# Patient Record
Sex: FEMALE | Race: WHITE | NOT HISPANIC OR LATINO | Employment: PART TIME | URBAN - METROPOLITAN AREA
[De-identification: names, ages, dates, MRNs, and addresses within clinical notes are randomized per-mention and may not be internally consistent; named-entity substitution may affect disease eponyms.]

---

## 2018-07-05 ENCOUNTER — TRANSCRIBE ORDERS (OUTPATIENT)
Dept: ADMINISTRATIVE | Facility: HOSPITAL | Age: 33
End: 2018-07-05

## 2018-07-05 DIAGNOSIS — Z80.3 FAMILY HISTORY OF BREAST CANCER IN MOTHER: ICD-10-CM

## 2018-07-05 DIAGNOSIS — Z12.31 SCREENING MAMMOGRAM, ENCOUNTER FOR: Primary | ICD-10-CM

## 2018-07-05 DIAGNOSIS — R22.31 AXILLARY LUMP, RIGHT: ICD-10-CM

## 2018-07-13 ENCOUNTER — HOSPITAL ENCOUNTER (OUTPATIENT)
Dept: ULTRASOUND IMAGING | Facility: HOSPITAL | Age: 33
Discharge: HOME | End: 2018-07-13

## 2018-07-13 ENCOUNTER — HOSPITAL ENCOUNTER (OUTPATIENT)
Dept: ULTRASOUND IMAGING | Facility: HOSPITAL | Age: 33
End: 2018-07-13

## 2018-07-13 ENCOUNTER — HOSPITAL ENCOUNTER (OUTPATIENT)
Dept: MAMMOGRAPHY | Facility: HOSPITAL | Age: 33
Discharge: HOME | End: 2018-07-13
Admitting: OBSTETRICS & GYNECOLOGY

## 2018-07-13 DIAGNOSIS — Z80.3 FAMILY HISTORY OF BREAST CANCER IN MOTHER: ICD-10-CM

## 2018-07-13 DIAGNOSIS — Z12.31 SCREENING MAMMOGRAM, ENCOUNTER FOR: ICD-10-CM

## 2018-07-13 DIAGNOSIS — R22.31 AXILLARY LUMP, RIGHT: ICD-10-CM

## 2018-07-13 PROCEDURE — 77066 DX MAMMO INCL CAD BI: CPT

## 2018-07-13 PROCEDURE — 76641 ULTRASOUND BREAST COMPLETE: CPT

## 2018-07-13 PROCEDURE — G0279 TOMOSYNTHESIS, MAMMO: HCPCS

## 2024-02-19 ENCOUNTER — APPOINTMENT (OUTPATIENT)
Dept: CT IMAGING | Facility: HOSPITAL | Age: 39
End: 2024-02-19
Payer: COMMERCIAL

## 2024-02-19 ENCOUNTER — HOSPITAL ENCOUNTER (EMERGENCY)
Facility: HOSPITAL | Age: 39
Discharge: HOME | End: 2024-02-19
Admitting: EMERGENCY MEDICINE
Payer: COMMERCIAL

## 2024-02-19 VITALS
OXYGEN SATURATION: 100 % | BODY MASS INDEX: 23.8 KG/M2 | TEMPERATURE: 98.7 F | RESPIRATION RATE: 16 BRPM | SYSTOLIC BLOOD PRESSURE: 114 MMHG | HEART RATE: 59 BPM | DIASTOLIC BLOOD PRESSURE: 77 MMHG | HEIGHT: 71 IN | WEIGHT: 170 LBS

## 2024-02-19 DIAGNOSIS — R10.31 ACUTE ABDOMINAL PAIN IN RIGHT LOWER QUADRANT: Primary | ICD-10-CM

## 2024-02-19 DIAGNOSIS — N83.201 CYST OF RIGHT OVARY: ICD-10-CM

## 2024-02-19 DIAGNOSIS — R55 VASOVAGAL SYNCOPE: ICD-10-CM

## 2024-02-19 LAB
ALBUMIN SERPL-MCNC: 4.3 G/DL (ref 3.5–5.2)
ALBUMIN/GLOB SERPL: 1.7 G/DL
ALP SERPL-CCNC: 70 U/L (ref 39–117)
ALT SERPL W P-5'-P-CCNC: 23 U/L (ref 1–33)
ANION GAP SERPL CALCULATED.3IONS-SCNC: 9.7 MMOL/L (ref 5–15)
AST SERPL-CCNC: 27 U/L (ref 1–32)
BACTERIA UR QL AUTO: ABNORMAL /HPF
BASOPHILS # BLD AUTO: 0.03 10*3/MM3 (ref 0–0.2)
BASOPHILS NFR BLD AUTO: 0.9 % (ref 0–1.5)
BILIRUB SERPL-MCNC: 0.3 MG/DL (ref 0–1.2)
BILIRUB UR QL STRIP: NEGATIVE
BUN SERPL-MCNC: 13 MG/DL (ref 6–20)
BUN/CREAT SERPL: 13.7 (ref 7–25)
CALCIUM SPEC-SCNC: 8.8 MG/DL (ref 8.6–10.5)
CHLORIDE SERPL-SCNC: 106 MMOL/L (ref 98–107)
CLARITY UR: CLEAR
CO2 SERPL-SCNC: 25.3 MMOL/L (ref 22–29)
COLOR UR: YELLOW
CREAT SERPL-MCNC: 0.95 MG/DL (ref 0.57–1)
DEPRECATED RDW RBC AUTO: 37 FL (ref 37–54)
EGFRCR SERPLBLD CKD-EPI 2021: 78.8 ML/MIN/1.73
EOSINOPHIL # BLD AUTO: 0.05 10*3/MM3 (ref 0–0.4)
EOSINOPHIL NFR BLD AUTO: 1.4 % (ref 0.3–6.2)
ERYTHROCYTE [DISTWIDTH] IN BLOOD BY AUTOMATED COUNT: 12.2 % (ref 12.3–15.4)
GLOBULIN UR ELPH-MCNC: 2.6 GM/DL
GLUCOSE SERPL-MCNC: 102 MG/DL (ref 65–99)
GLUCOSE UR STRIP-MCNC: NEGATIVE MG/DL
HCG SERPL QL: NEGATIVE
HCT VFR BLD AUTO: 38.4 % (ref 34–46.6)
HGB BLD-MCNC: 12.8 G/DL (ref 12–15.9)
HGB UR QL STRIP.AUTO: NEGATIVE
HYALINE CASTS UR QL AUTO: ABNORMAL /LPF
IMM GRANULOCYTES # BLD AUTO: 0.01 10*3/MM3 (ref 0–0.05)
IMM GRANULOCYTES NFR BLD AUTO: 0.3 % (ref 0–0.5)
KETONES UR QL STRIP: NEGATIVE
LEUKOCYTE ESTERASE UR QL STRIP.AUTO: ABNORMAL
LIPASE SERPL-CCNC: 22 U/L (ref 13–60)
LYMPHOCYTES # BLD AUTO: 1.2 10*3/MM3 (ref 0.7–3.1)
LYMPHOCYTES NFR BLD AUTO: 34.8 % (ref 19.6–45.3)
MCH RBC QN AUTO: 28.3 PG (ref 26.6–33)
MCHC RBC AUTO-ENTMCNC: 33.3 G/DL (ref 31.5–35.7)
MCV RBC AUTO: 84.8 FL (ref 79–97)
MONOCYTES # BLD AUTO: 0.59 10*3/MM3 (ref 0.1–0.9)
MONOCYTES NFR BLD AUTO: 17.1 % (ref 5–12)
NEUTROPHILS NFR BLD AUTO: 1.57 10*3/MM3 (ref 1.7–7)
NEUTROPHILS NFR BLD AUTO: 45.5 % (ref 42.7–76)
NITRITE UR QL STRIP: NEGATIVE
NRBC BLD AUTO-RTO: 0 /100 WBC (ref 0–0.2)
PH UR STRIP.AUTO: 6.5 [PH] (ref 5–8)
PLATELET # BLD AUTO: 194 10*3/MM3 (ref 140–450)
PMV BLD AUTO: 8.9 FL (ref 6–12)
POTASSIUM SERPL-SCNC: 4.2 MMOL/L (ref 3.5–5.2)
PROT SERPL-MCNC: 6.9 G/DL (ref 6–8.5)
PROT UR QL STRIP: NEGATIVE
QT INTERVAL: 411 MS
QTC INTERVAL: 444 MS
RBC # BLD AUTO: 4.53 10*6/MM3 (ref 3.77–5.28)
RBC # UR STRIP: ABNORMAL /HPF
REF LAB TEST METHOD: ABNORMAL
SODIUM SERPL-SCNC: 141 MMOL/L (ref 136–145)
SP GR UR STRIP: 1.01 (ref 1–1.03)
SQUAMOUS #/AREA URNS HPF: ABNORMAL /HPF
TROPONIN T SERPL HS-MCNC: <6 NG/L
UROBILINOGEN UR QL STRIP: ABNORMAL
WBC # UR STRIP: ABNORMAL /HPF
WBC NRBC COR # BLD AUTO: 3.45 10*3/MM3 (ref 3.4–10.8)

## 2024-02-19 PROCEDURE — 99285 EMERGENCY DEPT VISIT HI MDM: CPT

## 2024-02-19 PROCEDURE — 25810000003 SODIUM CHLORIDE 0.9 % SOLUTION: Performed by: PHYSICIAN ASSISTANT

## 2024-02-19 PROCEDURE — 84703 CHORIONIC GONADOTROPIN ASSAY: CPT | Performed by: EMERGENCY MEDICINE

## 2024-02-19 PROCEDURE — 25510000001 IOPAMIDOL 61 % SOLUTION: Performed by: PHYSICIAN ASSISTANT

## 2024-02-19 PROCEDURE — 74177 CT ABD & PELVIS W/CONTRAST: CPT

## 2024-02-19 PROCEDURE — 85025 COMPLETE CBC W/AUTO DIFF WBC: CPT | Performed by: EMERGENCY MEDICINE

## 2024-02-19 PROCEDURE — 80053 COMPREHEN METABOLIC PANEL: CPT | Performed by: EMERGENCY MEDICINE

## 2024-02-19 PROCEDURE — 93010 ELECTROCARDIOGRAM REPORT: CPT | Performed by: INTERNAL MEDICINE

## 2024-02-19 PROCEDURE — 84484 ASSAY OF TROPONIN QUANT: CPT | Performed by: EMERGENCY MEDICINE

## 2024-02-19 PROCEDURE — 93005 ELECTROCARDIOGRAM TRACING: CPT | Performed by: EMERGENCY MEDICINE

## 2024-02-19 PROCEDURE — 81001 URINALYSIS AUTO W/SCOPE: CPT | Performed by: EMERGENCY MEDICINE

## 2024-02-19 PROCEDURE — 83690 ASSAY OF LIPASE: CPT | Performed by: EMERGENCY MEDICINE

## 2024-02-19 RX ORDER — SODIUM CHLORIDE 0.9 % (FLUSH) 0.9 %
10 SYRINGE (ML) INJECTION AS NEEDED
Status: DISCONTINUED | OUTPATIENT
Start: 2024-02-19 | End: 2024-02-19 | Stop reason: HOSPADM

## 2024-02-19 RX ADMIN — SODIUM CHLORIDE 1000 ML: 9 INJECTION, SOLUTION INTRAVENOUS at 09:14

## 2024-02-19 RX ADMIN — IOPAMIDOL 85 ML: 612 INJECTION, SOLUTION INTRAVENOUS at 09:27

## 2024-02-19 NOTE — ED PROVIDER NOTES
EMERGENCY DEPARTMENT ENCOUNTER  Room Number:  34/34  PCP: Provider, No Known  Independent Historians: Patient and Family      HPI:  Chief Complaint: had concerns including Abdominal Pain and Nausea.     A complete HPI/ROS/PMH/PSH/SH/FH are unobtainable due to: None    Chronic or social conditions impacting patient care (Social Determinants of Health): None      Context: Sharon Horne is a 38 y.o. female with a medical history of rheumatoid arthritis who presents to the ED c/o acute RLQ abdominal pain and syncope.  Patient reports she first noticed a brief RLQ pain last night that resolved.  She had another RLQ pain this morning and went to the bathroom.  After sitting on the toilet she got lightheaded, nauseated, and had a syncopal episode.  She called for her  who found her still sitting on the toilet.  She is not currently having abdominal pain.  No head injury.  Patient is not anticoagulated.  She denies fever, vomiting, chest pain, dyspnea, diarrhea, dysuria, or any other systemic complaint.      Review of prior external notes (non-ED) -and- Review of prior external test results outside of this encounter:  Patient seen at [facility] clinic on 2023 for upper respiratory infection.  Patient prescribed prednisone and Phenergan DM syrup.  Reviewed prior laboratory studies.  COVID/flu swab collected on 2022 positive for influenza A.    Prescription drug monitoring program review:     N/A    PAST MEDICAL HISTORY  Active Ambulatory Problems     Diagnosis Date Noted    No Active Ambulatory Problems     Resolved Ambulatory Problems     Diagnosis Date Noted    No Resolved Ambulatory Problems     Past Medical History:   Diagnosis Date    Drug therapy     Rheumatoid arteritis          PAST SURGICAL HISTORY  No past surgical history on file.      FAMILY HISTORY  Family History   Problem Relation Age of Onset    Breast cancer Mother 41                 SOCIAL HISTORY  Social History     Socioeconomic  History    Marital status:    Tobacco Use    Smoking status: Never     Passive exposure: Never    Smokeless tobacco: Never   Vaping Use    Vaping Use: Never used   Substance and Sexual Activity    Alcohol use: Yes    Drug use: Never    Sexual activity: Defer         ALLERGIES  Fexofenadine      REVIEW OF SYSTEMS  Review of Systems   Constitutional:  Negative for chills and fever.   HENT:  Negative for ear pain and sore throat.    Respiratory:  Negative for cough and shortness of breath.    Cardiovascular:  Negative for chest pain and palpitations.   Gastrointestinal:  Positive for abdominal pain and nausea. Negative for vomiting.   Genitourinary:  Negative for dysuria and hematuria.   Musculoskeletal:  Negative for arthralgias and joint swelling.   Skin:  Negative for pallor and rash.   Neurological:  Positive for syncope and light-headedness. Negative for numbness and headaches.   Psychiatric/Behavioral:  Negative for confusion and hallucinations.      Included in HPI  All systems reviewed and negative except for those discussed in HPI.      PHYSICAL EXAM    I have reviewed the triage vital signs and nursing notes.    ED Triage Vitals [02/19/24 0816]   Temp Heart Rate Resp BP SpO2   98.7 °F (37.1 °C) 111 16 -- 100 %      Temp src Heart Rate Source Patient Position BP Location FiO2 (%)   -- -- -- -- --       Physical Exam  Constitutional:       General: She is not in acute distress.     Appearance: She is well-developed.   HENT:      Head: Normocephalic and atraumatic.   Eyes:      Extraocular Movements: Extraocular movements intact.   Cardiovascular:      Rate and Rhythm: Normal rate and regular rhythm.      Heart sounds: Normal heart sounds.      Comments: Distal pulses intact  Pulmonary:      Effort: Pulmonary effort is normal.      Breath sounds: Normal breath sounds.   Abdominal:      General: There is no distension.   Skin:     General: Skin is warm.   Neurological:      General: No focal deficit  present.      Mental Status: She is alert and oriented to person, place, and time.   Psychiatric:         Mood and Affect: Mood normal.             LAB RESULTS  Recent Results (from the past 24 hour(s))   Urinalysis With Microscopic If Indicated (No Culture) - Urine, Clean Catch    Collection Time: 02/19/24  8:26 AM    Specimen: Urine, Clean Catch   Result Value Ref Range    Color, UA Yellow Yellow, Straw    Appearance, UA Clear Clear    pH, UA 6.5 5.0 - 8.0    Specific Gravity, UA 1.010 1.005 - 1.030    Glucose, UA Negative Negative    Ketones, UA Negative Negative    Bilirubin, UA Negative Negative    Blood, UA Negative Negative    Protein, UA Negative Negative    Leuk Esterase, UA Moderate (2+) (A) Negative    Nitrite, UA Negative Negative    Urobilinogen, UA 0.2 E.U./dL 0.2 - 1.0 E.U./dL   Urinalysis, Microscopic Only - Urine, Clean Catch    Collection Time: 02/19/24  8:26 AM    Specimen: Urine, Clean Catch   Result Value Ref Range    RBC, UA None Seen None Seen, 0-2 /HPF    WBC, UA 11-20 (A) None Seen, 0-2 /HPF    Bacteria, UA 2+ (A) None Seen /HPF    Squamous Epithelial Cells, UA 3-6 (A) None Seen, 0-2 /HPF    Hyaline Casts, UA None Seen None Seen /LPF    Methodology Manual Light Microscopy    ECG 12 Lead Syncope    Collection Time: 02/19/24  8:31 AM   Result Value Ref Range    QT Interval 411 ms    QTC Interval 444 ms   Comprehensive Metabolic Panel    Collection Time: 02/19/24  8:39 AM    Specimen: Blood   Result Value Ref Range    Glucose 102 (H) 65 - 99 mg/dL    BUN 13 6 - 20 mg/dL    Creatinine 0.95 0.57 - 1.00 mg/dL    Sodium 141 136 - 145 mmol/L    Potassium 4.2 3.5 - 5.2 mmol/L    Chloride 106 98 - 107 mmol/L    CO2 25.3 22.0 - 29.0 mmol/L    Calcium 8.8 8.6 - 10.5 mg/dL    Total Protein 6.9 6.0 - 8.5 g/dL    Albumin 4.3 3.5 - 5.2 g/dL    ALT (SGPT) 23 1 - 33 U/L    AST (SGOT) 27 1 - 32 U/L    Alkaline Phosphatase 70 39 - 117 U/L    Total Bilirubin 0.3 0.0 - 1.2 mg/dL    Globulin 2.6 gm/dL    A/G Ratio  1.7 g/dL    BUN/Creatinine Ratio 13.7 7.0 - 25.0    Anion Gap 9.7 5.0 - 15.0 mmol/L    eGFR 78.8 >60.0 mL/min/1.73   hCG, Serum, Qualitative    Collection Time: 02/19/24  8:39 AM    Specimen: Blood   Result Value Ref Range    HCG Qualitative Negative Negative   Lipase    Collection Time: 02/19/24  8:39 AM    Specimen: Blood   Result Value Ref Range    Lipase 22 13 - 60 U/L   CBC Auto Differential    Collection Time: 02/19/24  8:39 AM    Specimen: Blood   Result Value Ref Range    WBC 3.45 3.40 - 10.80 10*3/mm3    RBC 4.53 3.77 - 5.28 10*6/mm3    Hemoglobin 12.8 12.0 - 15.9 g/dL    Hematocrit 38.4 34.0 - 46.6 %    MCV 84.8 79.0 - 97.0 fL    MCH 28.3 26.6 - 33.0 pg    MCHC 33.3 31.5 - 35.7 g/dL    RDW 12.2 (L) 12.3 - 15.4 %    RDW-SD 37.0 37.0 - 54.0 fl    MPV 8.9 6.0 - 12.0 fL    Platelets 194 140 - 450 10*3/mm3    Neutrophil % 45.5 42.7 - 76.0 %    Lymphocyte % 34.8 19.6 - 45.3 %    Monocyte % 17.1 (H) 5.0 - 12.0 %    Eosinophil % 1.4 0.3 - 6.2 %    Basophil % 0.9 0.0 - 1.5 %    Immature Grans % 0.3 0.0 - 0.5 %    Neutrophils, Absolute 1.57 (L) 1.70 - 7.00 10*3/mm3    Lymphocytes, Absolute 1.20 0.70 - 3.10 10*3/mm3    Monocytes, Absolute 0.59 0.10 - 0.90 10*3/mm3    Eosinophils, Absolute 0.05 0.00 - 0.40 10*3/mm3    Basophils, Absolute 0.03 0.00 - 0.20 10*3/mm3    Immature Grans, Absolute 0.01 0.00 - 0.05 10*3/mm3    nRBC 0.0 0.0 - 0.2 /100 WBC   Single High Sensitivity Troponin T    Collection Time: 02/19/24  8:39 AM    Specimen: Blood   Result Value Ref Range    HS Troponin T <6 <14 ng/L         RADIOLOGY  CT Abdomen Pelvis With Contrast    Result Date: 2/19/2024  CT ABDOMEN AND PELVIS WITH IV CONTRAST  HISTORY: Right lower quadrant abdominal pain with syncope.  TECHNIQUE:  CT includes axial imaging from the lung bases to the trochanters with intravenous contrast and without use of oral contrast. Data reconstructed in coronal and sagittal planes. Radiation dose reduction techniques were utilized, including  automated exposure control and exposure modulation based on body size.  COMPARISON: None.  FINDINGS: Within the anterior right lower lobe along the posterolateral margin of the right major fissure there is a 5 mm noncalcified nodule. There is a 3 mm left lower lobe noncalcified pulmonary nodule. Lung bases are otherwise clear. There is some motion artifact limiting evaluation of the upper abdomen. Liver, gallbladder, spleen, adrenal glands, pancreas, kidneys appear grossly within normal limits. There is no hydronephrosis.  Normal appendix is not definitively seen though there is no secondary evidence for appendicitis. There is a partially involuted right ovarian follicle measuring 1.9 cm. Trace free fluid is present in the pelvis which is most likely physiologic. There is no bowel dilatation or evidence for bowel obstruction.      1. No convincing evidence for acute abnormality in the abdomen or pelvis. Normal appendix is not definitively seen though there is no secondary evidence for appendicitis. There is mild free fluid in the pelvis which is most likely physiologic. Partially involuted right ovarian cyst measures 1.9 cm. 2. A 5 mm noncalcified right lower lobe nodule along the posterior margin of the right major fissure. 3 mm left lower lobe noncalcified nodule. Recommend follow-up with noncontrasted chest CT.  Radiation dose reduction techniques were utilized, including automated exposure control and exposure modulation based on body size.          MEDICATIONS GIVEN IN ER  Medications   sodium chloride 0.9 % flush 10 mL (has no administration in time range)   sodium chloride 0.9 % bolus 1,000 mL (0 mL Intravenous Stopped 2/19/24 7811)   iopamidol (ISOVUE-300) 61 % injection 100 mL (85 mL Intravenous Given by Other 2/19/24 1043)         ORDERS PLACED DURING THIS VISIT:  Orders Placed This Encounter   Procedures    CT Abdomen Pelvis With Contrast    Comprehensive Metabolic Panel    hCG, Serum, Qualitative     Lipase    Urinalysis With Microscopic If Indicated (No Culture) - Urine, Clean Catch    CBC Auto Differential    Urinalysis, Microscopic Only - Urine, Clean Catch    Single High Sensitivity Troponin T    Pulse Oximetry, Continuous    Monitor Blood Pressure    Orthostatic Vitals    ECG 12 Lead Syncope    Insert Peripheral IV    CBC & Differential         OUTPATIENT MEDICATION MANAGEMENT:  Current Facility-Administered Medications Ordered in Epic   Medication Dose Route Frequency Provider Last Rate Last Admin    sodium chloride 0.9 % flush 10 mL  10 mL Intravenous PRN Lee Olivo MD         Current Outpatient Medications Ordered in Epic   Medication Sig Dispense Refill    escitalopram (LEXAPRO) 10 MG tablet Take 1 tablet by mouth every night at bedtime.      azithromycin (Zithromax Z-Antonio) 250 MG tablet Take 2 tablets by mouth on day 1, then 1 tablet daily on days 2-5 6 tablet 0    fluticasone (FLONASE) 50 MCG/ACT nasal spray 2 sprays into the nostril(s) as directed by provider Daily for 30 days. 16 g 0             PROGRESS, DATA ANALYSIS, CONSULTS, AND MEDICAL DECISION MAKING  All labs have been independently interpreted by me.  All radiology studies have been reviewed by me. All EKG's have been independently viewed and interpreted by me.  Discussion below represents my analysis of pertinent findings related to patient's condition, differential diagnosis, treatment plan and final disposition.    Differential diagnosis includes but is not limited to orthostatic syncope, vasovagal syncope, kidney stone, appendicitis, ovarian torsion.        ED Course as of 02/19/24 1208   Mon Feb 19, 2024   0901 WBC: 3.45 [MP]   0902 Hemoglobin: 12.8 [MP]   0902 RBC, UA: None Seen [MP]   0902 WBC, UA(!): 11-20 [MP]   0902 Bacteria, UA(!): 2+ [MP]   0902 Squamous Epithelial Cells, UA(!): 3-6 [MP]   0907 EKG          EKG time: 8:31 AM  Rhythm/Rate: Sinus rhythm, 70  P waves and [place]: Normal  QRS, axis: Normal axis  ST and T  waves: No acute ischemic changes    Interpreted Contemporaneously by me, independently viewed       [JR]   1118 Squamous Epithelial Cells, UA(!): 3-6 [JR]   1118 Bacteria, UA(!): 2+ [JR]   1118 Nitrite, UA: Negative [JR]      ED Course User Index  [JR] Lee Olivo MD  [MP] Klaudia Duke PA-C             AS OF 12:08 EST VITALS:    BP - 114/77  HR - 59  TEMP - 98.7 °F (37.1 °C)  O2 SATS - 100%    COMPLEXITY OF CARE  Admission was considered but after careful review of the patient's presentation, physical examination, diagnostic results, and response to treatment the patient may be safely discharged with outpatient follow-up.      DIAGNOSIS  Final diagnoses:   Acute abdominal pain in right lower quadrant   Cyst of right ovary   Vasovagal syncope         DISPOSITION  ED Disposition       ED Disposition   Discharge    Condition   Stable    Comment   --                Please note that portions of this document were completed with a voice recognition program.    Note Disclaimer: At [facility], we believe that sharing information builds trust and better relationships. You are receiving this note because you recently visited [facility]. It is possible you will see health information before a provider has talked with you about it. This kind of information can be easy to misunderstand. To help you fully understand what it means for your health, we urge you to discuss this note with your provider.         Klaudia Duke PA-C  02/19/24 2105

## 2024-02-19 NOTE — ED NOTES
"Patient to ER via car from home for \"I got up to go to the bathroom and had sharp RLQ pain and then sat down on the toilet and passed out\"    Unknown if patient hit head  states she was laying over against the shower when he found her    Reports she had pain last night and reports some nausea   "

## 2024-02-19 NOTE — ED PROVIDER NOTES
MD ATTESTATION NOTE    The TRACIE and I have discussed this patient's history, physical exam, MDM, and treatment plan.  I have reviewed the documentation and personally had a face to face interaction with the patient. I affirm the documentation and agree with the treatment and plan.  The attached note describes my personal findings.      I provided a substantive portion of the medical decision making.        Brief HPI: Patient presents with complaint of acute right lower abdominal pain this morning.  She states that she had a little bit of right lower abdominal pain and right flank pain last night as well.  She felt the urge to urinate today and when she went to the bathroom she suddenly felt sharp pain in her right lower abdomen and then proceeded to feel hot and nauseated and lightheaded.  She briefly passed out.  She does not believe that she hit her head.  She states that she feels much better now and the pain is actually improved.  She did void urine but reports it seemed a little hard to void.  She denies history of kidney stones.  She has had previous , no other abdominal surgeries.  She denies diarrhea, black or bloody stool.  No chest pain or shortness of breath.    PHYSICAL EXAM  ED Triage Vitals   Temp Heart Rate Resp BP SpO2   24 0816 24 0816 24 0816 24 0834 24 0816   98.7 °F (37.1 °C) 111 16 118/73 100 %      Temp src Heart Rate Source Patient Position BP Location FiO2 (%)   -- -- -- -- --                GENERAL: Awake and alert, well-appearing, no acute distress  HENT: nares patent, no scalp hematoma, no tongue trauma  EYES: no scleral icterus, pupils 3 mm reactive bilaterally, EOMI  CV: regular rhythm, normal rate, no murmur  RESPIRATORY: normal effort  ABDOMEN: soft, minimal tenderness right lower quadrant without rebound or guarding, negative Rovsing's  MUSCULOSKELETAL: no deformity, no calf tenderness bilateral lower extremities  NEURO: alert, moves all  extremities, follows commands, cranial nerves II through XII grossly intact with speech fluent and clear  PSYCH:  calm, cooperative  SKIN: warm, dry, no rash    Vital signs and nursing notes reviewed.        Medical Decision Making:  ED Course as of 02/19/24 1627   Mon Feb 19, 2024   0901 WBC: 3.45 [MP]   0902 Hemoglobin: 12.8 [MP]   0902 RBC, UA: None Seen [MP]   0902 WBC, UA(!): 11-20 [MP]   0902 Bacteria, UA(!): 2+ [MP]   0902 Squamous Epithelial Cells, UA(!): 3-6 [MP]   0907 EKG          EKG time: 8:31 AM  Rhythm/Rate: Sinus rhythm, 70  P waves and RI: Normal  QRS, axis: Normal axis  ST and T waves: No acute ischemic changes    Interpreted Contemporaneously by me, independently viewed       [JR]   1118 Squamous Epithelial Cells, UA(!): 3-6 [JR]   1118 Bacteria, UA(!): 2+ [JR]   1118 Nitrite, UA: Negative [JR]      ED Course User Index  [JR] Lee Olivo MD  [MP] Klaudia Duke PA-C       Patient reassessed prior to discharge.  Explained that her labs and CT abdomen appears reassuring.  She does have a small involuting right adnexal cyst that is likely just a dominant follicle related to recent ovulation.  There is no inflammatory change suggestive of appendicitis nor is her exam highly suspicious for appendicitis.  Her pain has resolved at this time.  Therefore I do not think that pelvic ultrasound to evaluate for ovarian torsion is warranted.  I did however explain return precautions should she have recurrent severe pain as I would want to exclude torsion with an ultrasound in that circumstance.  She voiced understanding and will return to the ER should she have recurrent symptoms.  I also explained that I think that her syncopal event was secondary to a vasovagal episode secondary to the pain.  Patient advised to follow-up with her PCP.      SHARED VISIT: This visit was performed by BOTH a physician and an APC. The substantive portion of the medical decision making was performed by this attesting  physician who made or approved the management plan and takes responsibility for patient management. All studies in the APC note (if performed) were independently interpreted by me.      Lee Olivo MD  02/19/24 0543

## 2024-05-02 ENCOUNTER — APPOINTMENT (OUTPATIENT)
Dept: CT IMAGING | Facility: HOSPITAL | Age: 39
End: 2024-05-02
Payer: COMMERCIAL

## 2024-05-02 ENCOUNTER — HOSPITAL ENCOUNTER (EMERGENCY)
Facility: HOSPITAL | Age: 39
Discharge: HOME | End: 2024-05-02
Attending: EMERGENCY MEDICINE
Payer: COMMERCIAL

## 2024-05-02 ENCOUNTER — APPOINTMENT (OUTPATIENT)
Dept: ULTRASOUND IMAGING | Facility: HOSPITAL | Age: 39
End: 2024-05-02
Payer: COMMERCIAL

## 2024-05-02 VITALS
HEART RATE: 75 BPM | TEMPERATURE: 97.8 F | SYSTOLIC BLOOD PRESSURE: 131 MMHG | RESPIRATION RATE: 16 BRPM | DIASTOLIC BLOOD PRESSURE: 91 MMHG | BODY MASS INDEX: 23.8 KG/M2 | OXYGEN SATURATION: 98 % | WEIGHT: 170 LBS | HEIGHT: 71 IN

## 2024-05-02 DIAGNOSIS — R10.31 RIGHT LOWER QUADRANT ABDOMINAL PAIN: ICD-10-CM

## 2024-05-02 DIAGNOSIS — R93.89 ABNORMAL PELVIC ULTRASOUND: ICD-10-CM

## 2024-05-02 DIAGNOSIS — R55 SYNCOPE AND COLLAPSE: Primary | ICD-10-CM

## 2024-05-02 LAB
ALBUMIN SERPL-MCNC: 4.2 G/DL (ref 3.5–5.2)
ALBUMIN/GLOB SERPL: 1.8 G/DL
ALP SERPL-CCNC: 67 U/L (ref 39–117)
ALT SERPL W P-5'-P-CCNC: 25 U/L (ref 1–33)
ANION GAP SERPL CALCULATED.3IONS-SCNC: 9 MMOL/L (ref 5–15)
AST SERPL-CCNC: 24 U/L (ref 1–32)
BACTERIA UR QL AUTO: ABNORMAL /HPF
BASOPHILS # BLD AUTO: 0.05 10*3/MM3 (ref 0–0.2)
BASOPHILS NFR BLD AUTO: 1 % (ref 0–1.5)
BILIRUB SERPL-MCNC: 0.3 MG/DL (ref 0–1.2)
BILIRUB UR QL STRIP: NEGATIVE
BUN SERPL-MCNC: 13 MG/DL (ref 6–20)
BUN/CREAT SERPL: 13.7 (ref 7–25)
CALCIUM SPEC-SCNC: 8.4 MG/DL (ref 8.6–10.5)
CHLORIDE SERPL-SCNC: 106 MMOL/L (ref 98–107)
CLARITY UR: CLEAR
CO2 SERPL-SCNC: 25 MMOL/L (ref 22–29)
COLOR UR: YELLOW
CREAT SERPL-MCNC: 0.95 MG/DL (ref 0.57–1)
DEPRECATED RDW RBC AUTO: 36.3 FL (ref 37–54)
EGFRCR SERPLBLD CKD-EPI 2021: 78.8 ML/MIN/1.73
EOSINOPHIL # BLD AUTO: 0.18 10*3/MM3 (ref 0–0.4)
EOSINOPHIL NFR BLD AUTO: 3.7 % (ref 0.3–6.2)
ERYTHROCYTE [DISTWIDTH] IN BLOOD BY AUTOMATED COUNT: 12 % (ref 12.3–15.4)
GLOBULIN UR ELPH-MCNC: 2.3 GM/DL
GLUCOSE SERPL-MCNC: 93 MG/DL (ref 65–99)
GLUCOSE UR STRIP-MCNC: NEGATIVE MG/DL
HCG SERPL QL: NEGATIVE
HCT VFR BLD AUTO: 36.8 % (ref 34–46.6)
HGB BLD-MCNC: 11.8 G/DL (ref 12–15.9)
HGB UR QL STRIP.AUTO: ABNORMAL
HYALINE CASTS UR QL AUTO: ABNORMAL /LPF
IMM GRANULOCYTES # BLD AUTO: 0.01 10*3/MM3 (ref 0–0.05)
IMM GRANULOCYTES NFR BLD AUTO: 0.2 % (ref 0–0.5)
KETONES UR QL STRIP: NEGATIVE
LEUKOCYTE ESTERASE UR QL STRIP.AUTO: ABNORMAL
LIPASE SERPL-CCNC: 25 U/L (ref 13–60)
LYMPHOCYTES # BLD AUTO: 1.9 10*3/MM3 (ref 0.7–3.1)
LYMPHOCYTES NFR BLD AUTO: 39 % (ref 19.6–45.3)
MCH RBC QN AUTO: 26.8 PG (ref 26.6–33)
MCHC RBC AUTO-ENTMCNC: 32.1 G/DL (ref 31.5–35.7)
MCV RBC AUTO: 83.4 FL (ref 79–97)
MONOCYTES # BLD AUTO: 0.62 10*3/MM3 (ref 0.1–0.9)
MONOCYTES NFR BLD AUTO: 12.7 % (ref 5–12)
NEUTROPHILS NFR BLD AUTO: 2.11 10*3/MM3 (ref 1.7–7)
NEUTROPHILS NFR BLD AUTO: 43.4 % (ref 42.7–76)
NITRITE UR QL STRIP: NEGATIVE
NRBC BLD AUTO-RTO: 0 /100 WBC (ref 0–0.2)
PH UR STRIP.AUTO: 6 [PH] (ref 5–8)
PLATELET # BLD AUTO: 227 10*3/MM3 (ref 140–450)
PMV BLD AUTO: 9 FL (ref 6–12)
POTASSIUM SERPL-SCNC: 4 MMOL/L (ref 3.5–5.2)
PROT SERPL-MCNC: 6.5 G/DL (ref 6–8.5)
PROT UR QL STRIP: NEGATIVE
QT INTERVAL: 418 MS
QTC INTERVAL: 435 MS
RBC # BLD AUTO: 4.41 10*6/MM3 (ref 3.77–5.28)
RBC # UR STRIP: ABNORMAL /HPF
REF LAB TEST METHOD: ABNORMAL
SODIUM SERPL-SCNC: 140 MMOL/L (ref 136–145)
SP GR UR STRIP: 1.01 (ref 1–1.03)
SQUAMOUS #/AREA URNS HPF: ABNORMAL /HPF
TROPONIN T SERPL HS-MCNC: <6 NG/L
UROBILINOGEN UR QL STRIP: ABNORMAL
WBC # UR STRIP: ABNORMAL /HPF
WBC NRBC COR # BLD AUTO: 4.87 10*3/MM3 (ref 3.4–10.8)

## 2024-05-02 PROCEDURE — 93010 ELECTROCARDIOGRAM REPORT: CPT | Performed by: INTERNAL MEDICINE

## 2024-05-02 PROCEDURE — 76856 US EXAM PELVIC COMPLETE: CPT

## 2024-05-02 PROCEDURE — 80053 COMPREHEN METABOLIC PANEL: CPT | Performed by: PHYSICIAN ASSISTANT

## 2024-05-02 PROCEDURE — 84703 CHORIONIC GONADOTROPIN ASSAY: CPT | Performed by: PHYSICIAN ASSISTANT

## 2024-05-02 PROCEDURE — 96374 THER/PROPH/DIAG INJ IV PUSH: CPT

## 2024-05-02 PROCEDURE — 81001 URINALYSIS AUTO W/SCOPE: CPT | Performed by: PHYSICIAN ASSISTANT

## 2024-05-02 PROCEDURE — 93005 ELECTROCARDIOGRAM TRACING: CPT

## 2024-05-02 PROCEDURE — 84484 ASSAY OF TROPONIN QUANT: CPT | Performed by: PHYSICIAN ASSISTANT

## 2024-05-02 PROCEDURE — 25810000003 SODIUM CHLORIDE 0.9 % SOLUTION: Performed by: NURSE PRACTITIONER

## 2024-05-02 PROCEDURE — 93976 VASCULAR STUDY: CPT

## 2024-05-02 PROCEDURE — 25010000002 KETOROLAC TROMETHAMINE PER 15 MG: Performed by: NURSE PRACTITIONER

## 2024-05-02 PROCEDURE — 96361 HYDRATE IV INFUSION ADD-ON: CPT

## 2024-05-02 PROCEDURE — 99285 EMERGENCY DEPT VISIT HI MDM: CPT

## 2024-05-02 PROCEDURE — 85025 COMPLETE CBC W/AUTO DIFF WBC: CPT | Performed by: PHYSICIAN ASSISTANT

## 2024-05-02 PROCEDURE — 76830 TRANSVAGINAL US NON-OB: CPT

## 2024-05-02 PROCEDURE — 74177 CT ABD & PELVIS W/CONTRAST: CPT

## 2024-05-02 PROCEDURE — 25510000001 IOPAMIDOL 61 % SOLUTION: Performed by: PHYSICIAN ASSISTANT

## 2024-05-02 PROCEDURE — 83690 ASSAY OF LIPASE: CPT | Performed by: PHYSICIAN ASSISTANT

## 2024-05-02 RX ORDER — KETOROLAC TROMETHAMINE 15 MG/ML
15 INJECTION, SOLUTION INTRAMUSCULAR; INTRAVENOUS ONCE
Status: COMPLETED | OUTPATIENT
Start: 2024-05-02 | End: 2024-05-02

## 2024-05-02 RX ORDER — SODIUM CHLORIDE 0.9 % (FLUSH) 0.9 %
10 SYRINGE (ML) INJECTION AS NEEDED
Status: DISCONTINUED | OUTPATIENT
Start: 2024-05-02 | End: 2024-05-02 | Stop reason: HOSPADM

## 2024-05-02 RX ADMIN — IOPAMIDOL 85 ML: 612 INJECTION, SOLUTION INTRAVENOUS at 06:44

## 2024-05-02 RX ADMIN — SODIUM CHLORIDE 1000 ML: 9 INJECTION, SOLUTION INTRAVENOUS at 08:11

## 2024-05-02 RX ADMIN — KETOROLAC TROMETHAMINE 15 MG: 15 INJECTION, SOLUTION INTRAMUSCULAR; INTRAVENOUS at 08:12

## 2024-05-02 NOTE — ED PROVIDER NOTES
EMERGENCY DEPARTMENT ENCOUNTER  Room Number:  04/04  PCP: Provider, No Known  Independent Historians: Patient      HPI:  Chief Complaint: had concerns including Syncope and Abdominal Pain.     A complete HPI/ROS/PMH/PSH/SH/FH are unobtainable due to: None    Chronic or social conditions impacting patient care (Social Determinants of Health): None      Context: Sharon Horne is a 38 y.o. female with a medical history of ovarian cyst, who presents to the emergency department with complaints of right lower quadrant abdominal pain.  Patient states she woke up, got out of bed, and was walking to the restroom when she passed out.  She states that she did feel lightheaded prior to passing out.  Patient's  was able to assist her to the ground, so she did not hit her head.  Patient informs she has experienced a syncopal episode previously.  Patient denies fever, chills, headache, lightheadedness, dizziness, numbness, tingling, unilateral extremity weakness, syncope, shortness of breath, chest pain, abdominal pain, nausea, vomiting, diarrhea, dysuria, hematuria, or other complaints.        Review of prior external notes (non-ED) -and- Review of prior external test results outside of this encounter:   February 19, 2024: CT abdomen pelvis with IV contrast report.  Impression: No convincing evidence for acute abnormality in the abdomen and pelvis.  Mild free fluid in the pelvis which is most likely physiologic.  Partially involuted right ovarian cyst measuring 1.9 cm.      PAST MEDICAL HISTORY  Active Ambulatory Problems     Diagnosis Date Noted    No Active Ambulatory Problems     Resolved Ambulatory Problems     Diagnosis Date Noted    No Resolved Ambulatory Problems     Past Medical History:   Diagnosis Date    Drug therapy     Rheumatoid arteritis          PAST SURGICAL HISTORY  No past surgical history on file.      FAMILY HISTORY  Family History   Problem Relation Age of Onset    Breast cancer Mother 41                  SOCIAL HISTORY  Social History     Socioeconomic History    Marital status:    Tobacco Use    Smoking status: Never     Passive exposure: Never    Smokeless tobacco: Never   Vaping Use    Vaping status: Never Used   Substance and Sexual Activity    Alcohol use: Yes    Drug use: Never    Sexual activity: Defer         ALLERGIES  Fexofenadine      REVIEW OF SYSTEMS  Included in HPI  All systems reviewed and negative except for those discussed in HPI.      PHYSICAL EXAM    I have reviewed the triage vital signs and nursing notes.    ED Triage Vitals   Temp Heart Rate Resp BP SpO2   24   97.8 °F (36.6 °C) 77 16 114/77 100 %      Temp src Heart Rate Source Patient Position BP Location FiO2 (%)   24 --   Tympanic Monitor Sitting Right arm        GENERAL: not distressed  HENT: nares patent  Head/neck/ face are symmetric without gross deformity or swelling  EYES: no scleral icterus  CV: regular rhythm, regular rate with intact distal pulses  RESPIRATORY: normal effort and no respiratory distress  ABDOMEN: soft and non-tender  MUSCULOSKELETAL: no deformity  NEURO: alert and appropriate, moves all extremities, follows commands  SKIN: warm, dry    Vital signs and nursing notes reviewed.      LAB RESULTS  Recent Results (from the past 24 hour(s))   ECG 12 Lead Syncope    Collection Time: 24  4:07 AM   Result Value Ref Range    QT Interval 418 ms    QTC Interval 435 ms   Comprehensive Metabolic Panel    Collection Time: 24  5:05 AM    Specimen: Blood   Result Value Ref Range    Glucose 93 65 - 99 mg/dL    BUN 13 6 - 20 mg/dL    Creatinine 0.95 0.57 - 1.00 mg/dL    Sodium 140 136 - 145 mmol/L    Potassium 4.0 3.5 - 5.2 mmol/L    Chloride 106 98 - 107 mmol/L    CO2 25.0 22.0 - 29.0 mmol/L    Calcium 8.4 (L) 8.6 - 10.5 mg/dL    Total Protein 6.5 6.0 - 8.5 g/dL    Albumin 4.2 3.5  - 5.2 g/dL    ALT (SGPT) 25 1 - 33 U/L    AST (SGOT) 24 1 - 32 U/L    Alkaline Phosphatase 67 39 - 117 U/L    Total Bilirubin 0.3 0.0 - 1.2 mg/dL    Globulin 2.3 gm/dL    A/G Ratio 1.8 g/dL    BUN/Creatinine Ratio 13.7 7.0 - 25.0    Anion Gap 9.0 5.0 - 15.0 mmol/L    eGFR 78.8 >60.0 mL/min/1.73   Lipase    Collection Time: 05/02/24  5:05 AM    Specimen: Blood   Result Value Ref Range    Lipase 25 13 - 60 U/L   hCG, Serum, Qualitative    Collection Time: 05/02/24  5:05 AM    Specimen: Blood   Result Value Ref Range    HCG Qualitative Negative Negative   Single High Sensitivity Troponin T    Collection Time: 05/02/24  5:05 AM    Specimen: Blood   Result Value Ref Range    HS Troponin T <6 <14 ng/L   CBC Auto Differential    Collection Time: 05/02/24  5:05 AM    Specimen: Blood   Result Value Ref Range    WBC 4.87 3.40 - 10.80 10*3/mm3    RBC 4.41 3.77 - 5.28 10*6/mm3    Hemoglobin 11.8 (L) 12.0 - 15.9 g/dL    Hematocrit 36.8 34.0 - 46.6 %    MCV 83.4 79.0 - 97.0 fL    MCH 26.8 26.6 - 33.0 pg    MCHC 32.1 31.5 - 35.7 g/dL    RDW 12.0 (L) 12.3 - 15.4 %    RDW-SD 36.3 (L) 37.0 - 54.0 fl    MPV 9.0 6.0 - 12.0 fL    Platelets 227 140 - 450 10*3/mm3    Neutrophil % 43.4 42.7 - 76.0 %    Lymphocyte % 39.0 19.6 - 45.3 %    Monocyte % 12.7 (H) 5.0 - 12.0 %    Eosinophil % 3.7 0.3 - 6.2 %    Basophil % 1.0 0.0 - 1.5 %    Immature Grans % 0.2 0.0 - 0.5 %    Neutrophils, Absolute 2.11 1.70 - 7.00 10*3/mm3    Lymphocytes, Absolute 1.90 0.70 - 3.10 10*3/mm3    Monocytes, Absolute 0.62 0.10 - 0.90 10*3/mm3    Eosinophils, Absolute 0.18 0.00 - 0.40 10*3/mm3    Basophils, Absolute 0.05 0.00 - 0.20 10*3/mm3    Immature Grans, Absolute 0.01 0.00 - 0.05 10*3/mm3    nRBC 0.0 0.0 - 0.2 /100 WBC   Urinalysis With Microscopic If Indicated (No Culture) - Urine, Clean Catch    Collection Time: 05/02/24  5:34 AM    Specimen: Urine, Clean Catch   Result Value Ref Range    Color, UA Yellow Yellow, Straw    Appearance, UA Clear Clear    pH, UA 6.0  5.0 - 8.0    Specific Gravity, UA 1.012 1.005 - 1.030    Glucose, UA Negative Negative    Ketones, UA Negative Negative    Bilirubin, UA Negative Negative    Blood, UA Trace (A) Negative    Protein, UA Negative Negative    Leuk Esterase, UA Moderate (2+) (A) Negative    Nitrite, UA Negative Negative    Urobilinogen, UA 0.2 E.U./dL 0.2 - 1.0 E.U./dL   Urinalysis, Microscopic Only - Urine, Clean Catch    Collection Time: 05/02/24  5:34 AM    Specimen: Urine, Clean Catch   Result Value Ref Range    RBC, UA 0-2 None Seen, 0-2 /HPF    WBC, UA 21-50 (A) None Seen, 0-2 /HPF    Bacteria, UA None Seen None Seen /HPF    Squamous Epithelial Cells, UA 3-6 (A) None Seen, 0-2 /HPF    Hyaline Casts, UA None Seen None Seen /LPF    Methodology Automated Microscopy          RADIOLOGY  US Pelvis Transvaginal Non OB    Result Date: 5/2/2024  US PELVIC AND TRANSVAGINAL NONOBSTETRICAL-  CLINICAL INFORMATION: Right lower abdominal pain, recent ovarian cyst. Transabdominal and transvaginal imaging.  Grayscale imaging and color Doppler.  FINDINGS: Uterus measures 7.7 cm in length, 4 cm AP and 5.7 cm transverse. The endometrium measures a maximum of 6 mm in width. Possible partial septated uterus.  The right ovary measures 3.5 x 1.3 x 1.8 cm. The left ovary measures 3.1 x 1.9 x 1.9 cm. Normal arterial inflow and venous outflow of both ovaries demonstrated on color Doppler and spectral analysis.  There are several follicles arising from the surface of the left ovary measuring up to approximately 12 mm. Few tiny follicles arising from the surface of the right ovary. The largest 5 mm. There is no ovarian mass, no free fluid is demonstrated within the cul-de-sac. The remainder is unremarkable.  This report was finalized on 5/2/2024 10:28 AM by Dr. Casey Rock M.D on Workstation: [id number]      CT Abdomen Pelvis With Contrast    Result Date: 5/2/2024  CT ABDOMEN PELVIS W CONTRAST-  HISTORY: 38 years of age, Female. Right lower quadrant  abdominal pain followed by a syncopal episode.  TECHNIQUE:  CT includes axial imaging from the lung bases to the trochanters with intravenous contrast and without use of oral contrast. Data reconstructed in coronal and sagittal planes. Radiation dose reduction techniques were utilized, including automated exposure control and exposure modulation based on body size.  COMPARISON: CT abdomen and pelvis 02/19/2024.  FINDINGS: Lung bases appear clear and there is no basilar effusion.  Liver, gallbladder, spleen, adrenal glands, pancreas, kidneys appear within normal limits.  There is no bowel dilatation or evidence for bowel obstruction. There is no evidence to suggest appendicitis. Uterus and adnexal structures appear within normal limits. The urinary bladder is partially decompressed.      No evidence for acute abnormality in the abdomen/pelvis.  Radiation dose reduction techniques were utilized, including automated exposure control and exposure modulation based on body size.   This report was finalized on 5/2/2024 8:04 AM by Dr. Buddy Ferraro M.D on Workstation: [id number]         MEDICATIONS GIVEN IN ER  Medications   iopamidol (ISOVUE-300) 61 % injection 100 mL (85 mL Intravenous Given by Other 5/2/24 0644)   ketorolac (TORADOL) injection 15 mg (15 mg Intravenous Given 5/2/24 0812)   sodium chloride 0.9 % bolus 1,000 mL (0 mL Intravenous Stopped 5/2/24 1013)           OUTPATIENT MEDICATION MANAGEMENT:  No current Epic-ordered facility-administered medications on file.     Current Outpatient Medications Ordered in Epic   Medication Sig Dispense Refill    azithromycin (Zithromax Z-Antonio) 250 MG tablet Take 2 tablets by mouth on day 1, then 1 tablet daily on days 2-5 6 tablet 0    escitalopram (LEXAPRO) 10 MG tablet Take 1 tablet by mouth every night at bedtime.      fluticasone (FLONASE) 50 MCG/ACT nasal spray 2 sprays into the nostril(s) as directed by provider Daily for 30 days. 16 g 0           PROGRESS, DATA  ANALYSIS, CONSULTS, AND MEDICAL DECISION MAKING  ORDERS PLACED DURING THIS VISIT:  Orders Placed This Encounter   Procedures    CT Abdomen Pelvis With Contrast    US Pelvis Transvaginal Non OB    US Testicular or Ovarian Vascular Limited    Comprehensive Metabolic Panel    Lipase    hCG, Serum, Qualitative    Urinalysis With Microscopic If Indicated (No Culture) - Urine, Clean Catch    Single High Sensitivity Troponin T    CBC Auto Differential    Urinalysis, Microscopic Only - Urine, Clean Catch    ECG 12 Lead Syncope    Telemetry Scan    Telemetry Scan    CBC & Differential       All labs have been independently interpreted by me.  All radiology studies have been reviewed by me. All EKG's have been independently viewed by me.  Discussion below represents my analysis of pertinent findings related to patient's condition, differential diagnosis, treatment plan and final disposition.    Differential diagnosis includes but is not limited to:   Vasovagal syncope, acute appendicitis, diverticulitis, ovarian cyst, etc.    ED Course/Progress Notes/MDM:  ED Course as of 05/02/24 2258   Thu May 02, 2024   0540 First look: Patient presents emergency department with right lower quadrant abdominal pain that woke her from sleep.  She woke up her  as she was going to try to go to the restroom.  And route to the restroom she got lightheaded and had syncopal episode.  Her  was able to lower her to the ground.  She reports she had some nausea but did not vomit.  Patient reports this happened previously and was told by her GYN that she had a ruptured ovarian cyst.  Patient denies injury or trauma to the abdomen.    Will initiate workup with labs, EKG, and CT scan of abdomen and pelvis.  I did offer patient pain medication but she declines at this time. [MP]   0610 BP: 119/80  Documented at 5:59 AM. [AR]   0619 WBC: 4.87 [AR]   0619 Leukocytes, UA(!): Moderate (2+) [AR]   0620 WBC, UA(!): 21-50 [AR]   0620 Squamous  Epithelial Cells, UA(!): 3-6 [AR]   0620 Creatinine: 0.95 [AR]   0701 BP: 144/87 [AR]   0701 Heart Rate: 75 [AR]   0701 SpO2: 99 % [AR]   0705 I discussed the case with Dr. Cabrera and they agree to evaluate the patient at the bedside.    [AR]   0748 CT Abdomen Pelvis With Contrast  Report reviewed. [AR]   0752 Patient requests pelvic ultrasound due to previous history of ovarian cyst. [AR]   0824 Patient ambulated in the hallway without assistance, steady gait visualized. [AR]   0828 EKG ER MD interpretation   Time: 4: 07  Rhythm and rate: Normal sinus rhythm at a rate of 65  Axis: Normal  P waves: Normal  QRS complexes: Normal  ST segments: no elevation nor depressions  T waves: Nonspecific inverted T waves in lead III and aVF  Comparison EKG is from February 19, 2024 and was similar in appearance to today's [AR-2]   1023 CT Abdomen Pelvis With Contrast [AR-2]   1037 US Pelvis Transvaginal Non OB  Report reviewed. [AR]   1043 The patient was reexamined.  They have had symptomatic improvement during their ED stay.  I discussed today's findings with the patient, explaining the pertinent positives and negatives from today's visit, and the plan of care.  Discussed plan for discharge as there is no emergent indication for admission.  Discussed limitation of the ED work-up and that this is to rule out life-threatening emergencies but that they could require further testing as determined by their primary care and or any referred specialist patient is agreeable and understands need for follow-up and repeat exam/testing.  Patient is aware that discharge does not mean there is nothing wrong, indicates no emergency is present, and that they must continue their care with their primary care physician and/or any referred specialist.  They were given appropriate follow-up with their primary care physician and/or specialist.  I had an extensive discussion on the expected clinical course and return precautions.  Patient  understands to return to the emergency department for continuation, worsening, or new symptoms.  I answered any of the patient's questions. Patient was discharged home in a stable condition.     [AR]      ED Course User Index  [AR] Edith Del Castillo APRN  [AR-2] Gloria Cabrera MD  [MP] Klaudia Duke PA-C         MDM:  Patient is a 38-year-old female presents the emergency department with complaints of syncopal episode earlier this morning after she woke up to use the restroom and experienced right lower quadrant abdominal pain.  Labs unremarkable.  CT scan abdomen pelvis does not show emergent findings.  Patient reported previous syncopal episode with ovarian cyst, she requested pelvic ultrasound.  Pelvic ultrasound reveals follicles from both ovaries however no torsion.  Advised patient to follow-up with primary care provider, OB/GYN, and cardiology.  Vital signs are stable.  Patient will be discharged home, she is agreeable.  Strict return precautions given.      COMPLEXITY OF CARE  Admission was considered but after careful review of the patient's presentation, physical examination, diagnostic results, and response to treatment the patient may be safely discharged with outpatient follow-up.        DIAGNOSIS  Final diagnoses:   Syncope and collapse   Right lower quadrant abdominal pain   Abnormal pelvic ultrasound         DISPOSITION  ED Disposition       ED Disposition   Discharge    Condition   Stable    Comment   --                  Please note that portions of this document were completed with a voice recognition program.    Note Disclaimer: At [facility], we believe that sharing information builds trust and better relationships. You are receiving this note because you recently visited [facility]. It is possible you will see health information before a provider has talked with you about it. This kind of information can be easy to misunderstand. To help you fully understand what it means for  your health, we urge you to discuss this note with your provider.     Edith Del Castillo APRN  05/02/24 5012

## 2024-05-02 NOTE — ED PROVIDER NOTES
MD ATTESTATION NOTE    SHARED VISIT: This visit was performed by BOTH a physician and an APC. The substantive portion of the medical decision making was performed by this attesting physician who made or approved the management plan and takes responsibility for patient management. All studies in the APC note (if performed) were independently interpreted by me.     The TRACIE and I have discussed this patient's history, physical exam, and treatment plan.  I have reviewed the documentation and affirm the documentation and agree with the treatment and plan.  The attached note describes my personal findings.      Independent Historians: Patient    A complete HPI/ROS/PMH/PSH/SH/FH are unobtainable due to: None    Chronic or social conditions impacting patient care (social determinants of health): None    Sharon Horne is a 38 y.o. female who presents to the ED c/o acute syncopal episode with right lower abdominal pain.  Patient was in the bathroom after getting up and getting out of bed and had right lower quadrant discomfort, felt lightheaded, and then passed out while in the bathroom.  She was able to call out to her  when she felt bad so he was able to assist her to the ground so that she did not hit her head.  Patient has had 1 prior similar syncopal episode in the setting of having lower abdominal pain attributed to an ovarian cyst rupture in the past.  Patient denies any vomiting, fevers, diarrhea, shortness of breath, palpitations, chest pain.          On exam:  GENERAL: Pleasant cooperative well-appearing female, alert, no acute distress  SKIN: Warm, dry  HENT: Normocephalic, atraumatic  RESPIRATORY: Relaxed breathing  ABDOMEN: soft, mild lower abdominal tenderness to palpation with no rebound and no guarding, nondistended  MUSCULOSKELETAL: no deformity  NEURO: alert, moves all extremities, follows commands                                                             Labs  Recent Results (from the past 24  hour(s))   ECG 12 Lead Syncope    Collection Time: 05/02/24  4:07 AM   Result Value Ref Range    QT Interval 418 ms    QTC Interval 435 ms   Comprehensive Metabolic Panel    Collection Time: 05/02/24  5:05 AM    Specimen: Blood   Result Value Ref Range    Glucose 93 65 - 99 mg/dL    BUN 13 6 - 20 mg/dL    Creatinine 0.95 0.57 - 1.00 mg/dL    Sodium 140 136 - 145 mmol/L    Potassium 4.0 3.5 - 5.2 mmol/L    Chloride 106 98 - 107 mmol/L    CO2 25.0 22.0 - 29.0 mmol/L    Calcium 8.4 (L) 8.6 - 10.5 mg/dL    Total Protein 6.5 6.0 - 8.5 g/dL    Albumin 4.2 3.5 - 5.2 g/dL    ALT (SGPT) 25 1 - 33 U/L    AST (SGOT) 24 1 - 32 U/L    Alkaline Phosphatase 67 39 - 117 U/L    Total Bilirubin 0.3 0.0 - 1.2 mg/dL    Globulin 2.3 gm/dL    A/G Ratio 1.8 g/dL    BUN/Creatinine Ratio 13.7 7.0 - 25.0    Anion Gap 9.0 5.0 - 15.0 mmol/L    eGFR 78.8 >60.0 mL/min/1.73   Lipase    Collection Time: 05/02/24  5:05 AM    Specimen: Blood   Result Value Ref Range    Lipase 25 13 - 60 U/L   hCG, Serum, Qualitative    Collection Time: 05/02/24  5:05 AM    Specimen: Blood   Result Value Ref Range    HCG Qualitative Negative Negative   Single High Sensitivity Troponin T    Collection Time: 05/02/24  5:05 AM    Specimen: Blood   Result Value Ref Range    HS Troponin T <6 <14 ng/L   CBC Auto Differential    Collection Time: 05/02/24  5:05 AM    Specimen: Blood   Result Value Ref Range    WBC 4.87 3.40 - 10.80 10*3/mm3    RBC 4.41 3.77 - 5.28 10*6/mm3    Hemoglobin 11.8 (L) 12.0 - 15.9 g/dL    Hematocrit 36.8 34.0 - 46.6 %    MCV 83.4 79.0 - 97.0 fL    MCH 26.8 26.6 - 33.0 pg    MCHC 32.1 31.5 - 35.7 g/dL    RDW 12.0 (L) 12.3 - 15.4 %    RDW-SD 36.3 (L) 37.0 - 54.0 fl    MPV 9.0 6.0 - 12.0 fL    Platelets 227 140 - 450 10*3/mm3    Neutrophil % 43.4 42.7 - 76.0 %    Lymphocyte % 39.0 19.6 - 45.3 %    Monocyte % 12.7 (H) 5.0 - 12.0 %    Eosinophil % 3.7 0.3 - 6.2 %    Basophil % 1.0 0.0 - 1.5 %    Immature Grans % 0.2 0.0 - 0.5 %    Neutrophils, Absolute  2.11 1.70 - 7.00 10*3/mm3    Lymphocytes, Absolute 1.90 0.70 - 3.10 10*3/mm3    Monocytes, Absolute 0.62 0.10 - 0.90 10*3/mm3    Eosinophils, Absolute 0.18 0.00 - 0.40 10*3/mm3    Basophils, Absolute 0.05 0.00 - 0.20 10*3/mm3    Immature Grans, Absolute 0.01 0.00 - 0.05 10*3/mm3    nRBC 0.0 0.0 - 0.2 /100 WBC   Urinalysis With Microscopic If Indicated (No Culture) - Urine, Clean Catch    Collection Time: 05/02/24  5:34 AM    Specimen: Urine, Clean Catch   Result Value Ref Range    Color, UA Yellow Yellow, Straw    Appearance, UA Clear Clear    pH, UA 6.0 5.0 - 8.0    Specific Gravity, UA 1.012 1.005 - 1.030    Glucose, UA Negative Negative    Ketones, UA Negative Negative    Bilirubin, UA Negative Negative    Blood, UA Trace (A) Negative    Protein, UA Negative Negative    Leuk Esterase, UA Moderate (2+) (A) Negative    Nitrite, UA Negative Negative    Urobilinogen, UA 0.2 E.U./dL 0.2 - 1.0 E.U./dL   Urinalysis, Microscopic Only - Urine, Clean Catch    Collection Time: 05/02/24  5:34 AM    Specimen: Urine, Clean Catch   Result Value Ref Range    RBC, UA 0-2 None Seen, 0-2 /HPF    WBC, UA 21-50 (A) None Seen, 0-2 /HPF    Bacteria, UA None Seen None Seen /HPF    Squamous Epithelial Cells, UA 3-6 (A) None Seen, 0-2 /HPF    Hyaline Casts, UA None Seen None Seen /LPF    Methodology Automated Microscopy        Radiology  US Pelvis Transvaginal Non OB    Result Date: 5/2/2024  US PELVIC AND TRANSVAGINAL NONOBSTETRICAL-  CLINICAL INFORMATION: Right lower abdominal pain, recent ovarian cyst. Transabdominal and transvaginal imaging.  Grayscale imaging and color Doppler.  FINDINGS: Uterus measures 7.7 cm in length, 4 cm AP and 5.7 cm transverse. The endometrium measures a maximum of 6 mm in width. Possible partial septated uterus.  The right ovary measures 3.5 x 1.3 x 1.8 cm. The left ovary measures 3.1 x 1.9 x 1.9 cm. Normal arterial inflow and venous outflow of both ovaries demonstrated on color Doppler and spectral  analysis.  There are several follicles arising from the surface of the left ovary measuring up to approximately 12 mm. Few tiny follicles arising from the surface of the right ovary. The largest 5 mm. There is no ovarian mass, no free fluid is demonstrated within the cul-de-sac. The remainder is unremarkable.  This report was finalized on 5/2/2024 10:28 AM by Dr. Casey Rock M.D on Workstation: [id number]      CT Abdomen Pelvis With Contrast    Result Date: 5/2/2024  CT ABDOMEN PELVIS W CONTRAST-  HISTORY: 38 years of age, Female. Right lower quadrant abdominal pain followed by a syncopal episode.  TECHNIQUE:  CT includes axial imaging from the lung bases to the trochanters with intravenous contrast and without use of oral contrast. Data reconstructed in coronal and sagittal planes. Radiation dose reduction techniques were utilized, including automated exposure control and exposure modulation based on body size.  COMPARISON: CT abdomen and pelvis 02/19/2024.  FINDINGS: Lung bases appear clear and there is no basilar effusion.  Liver, gallbladder, spleen, adrenal glands, pancreas, kidneys appear within normal limits.  There is no bowel dilatation or evidence for bowel obstruction. There is no evidence to suggest appendicitis. Uterus and adnexal structures appear within normal limits. The urinary bladder is partially decompressed.      No evidence for acute abnormality in the abdomen/pelvis.  Radiation dose reduction techniques were utilized, including automated exposure control and exposure modulation based on body size.   This report was finalized on 5/2/2024 8:04 AM by Dr. Buddy Ferraro M.D on Workstation: [id number]       Medical Decision Making:  ED Course as of 05/02/24 1808   u May 02, 2024   0540 First look: Patient presents emergency department with right lower quadrant abdominal pain that woke her from sleep.  She woke up her  as she was going to try to go to the restroom.  And route to the  restroom she got lightheaded and had syncopal episode.  Her  was able to lower her to the ground.  She reports she had some nausea but did not vomit.  Patient reports this happened previously and was told by her GYN that she had a ruptured ovarian cyst.  Patient denies injury or trauma to the abdomen.    Will initiate workup with labs, EKG, and CT scan of abdomen and pelvis.  I did offer patient pain medication but she declines at this time. [MP]   0610 BP: 119/80  Documented at 5:59 AM. [AR]   0619 WBC: 4.87 [AR]   0619 Leukocytes, UA(!): Moderate (2+) [AR]   0620 WBC, UA(!): 21-50 [AR]   0620 Squamous Epithelial Cells, UA(!): 3-6 [AR]   0620 Creatinine: 0.95 [AR]   0701 BP: 144/87 [AR]   0701 Heart Rate: 75 [AR]   0701 SpO2: 99 % [AR]   0705 I discussed the case with Dr. Cabrera and they agree to evaluate the patient at the bedside.    [AR]   0748 CT Abdomen Pelvis With Contrast  Report reviewed. [AR]   0752 Patient requests pelvic ultrasound due to previous history of ovarian cyst. [AR]   0824 Patient ambulated in the hallway without assistance, steady gait visualized. [AR]   0828 EKG ER MD interpretation   Time: 4: 07  Rhythm and rate: Normal sinus rhythm at a rate of 65  Axis: Normal  P waves: Normal  QRS complexes: Normal  ST segments: no elevation nor depressions  T waves: Nonspecific inverted T waves in lead III and aVF  Comparison EKG is from February 19, 2024 and was similar in appearance to today's [AR-2]   1023 CT Abdomen Pelvis With Contrast [AR-2]   1037 US Pelvis Transvaginal Non OB  Report reviewed. [AR]   1043 The patient was reexamined.  They have had symptomatic improvement during their ED stay.  I discussed today's findings with the patient, explaining the pertinent positives and negatives from today's visit, and the plan of care.  Discussed plan for discharge as there is no emergent indication for admission.  Discussed limitation of the ED work-up and that this is to rule out  life-threatening emergencies but that they could require further testing as determined by their primary care and or any referred specialist patient is agreeable and understands need for follow-up and repeat exam/testing.  Patient is aware that discharge does not mean there is nothing wrong, indicates no emergency is present, and that they must continue their care with their primary care physician and/or any referred specialist.  They were given appropriate follow-up with their primary care physician and/or specialist.  I had an extensive discussion on the expected clinical course and return precautions.  Patient understands to return to the emergency department for continuation, worsening, or new symptoms.  I answered any of the patient's questions. Patient was discharged home in a stable condition.     [AR]      ED Course User Index  [AR] Edith Del Castillo APRN  [AR-2] Gloria Cabrera MD  [MP] Klaudia Duke PA-C       MDM: The differential diagnosis for syncope, collapse, or even near syncope/lightheadedness is quite broad and includes but is not limited to: hypoglycemia, orthostasis, vasovagal syncope, seizure, cardiac syncope, PE, ectopic pregnancy, electrolyte disturbances, sepsis, profound anemia, aortic dissection, severe aortic stenosis, stroke, sah, gi bleeding, intoxication and medication effects.    This patient presents with syncope or near-syncope. Seizure is less likely given the history and exam (lack of postictal period). No neurologic deficits on exam indicating a stroke, and no signs of head trauma or injury. Given no signs of trauma or neurologic deficit, I will withhold further imaging of the head per [facility] Choosing Wisely Recommendations. Additionally, the [facility] clinical policy on syncope evaluation recommends laboratory testing and advanced investigative testing such as echocardiography or head CT scanning need not be routinely performed unless guided by specific findings in the history  or physical examination.    Patient also presenting with right lower quadrant discomfort.  The differential diagnoses list includes appendicitis, ovarian cyst/cyst rupture/torsion, mesenteric lymphadenitis, cholecystitis/biliary colic, ureteral stone, pyelonephritis, small bowel obstruction, colitis, diverticulitis, AAA, malignancy, gastroenteritis, food intolerances, sickle cell, HSP/vasculitis, ectopic pregnancy, PID, and even parasitic conditions.  Abdominal exam is without peritoneal signs. There is no evidence of acute abdomen at this time. The patient is well appearing. I have a low suspicion for vascular catastrophe, bowel obstruction or viscus perforation.     [place] Syncope Risk score (for 30 day events): -3  Applicable to patients =16 years old presenting =24 hours of syncope. Do not use if: prolonged (>5 min) LOC, change in mental status from baseline, obvious witnessed seizure, major trauma, intoxication, language barrier, or head trauma causing LOC. This calculator is externally validated, according to data presented at the Society for Academic Emergency Medicine Annual Meeting 2018.  Nine measures:  Vasovagal symptom predisposition (Triggered by being in a warm crowded place, prolonged standing, fear, emotion, or pain) (-1)  Heart disease history CAD, atrial fibrillation or flutter, CHF, valvular disease (+1)  Any systolic Blood Pressure reading <90 mmHg or >180 mmHg (+2)  Troponin elevated (+2)  QRS Axis abnormal (<30 or >100 degrees) (+1)  QRS Duration >130 ms (+1)  QTc >480 ms (+2)  ED diagnosis based on eval:   Vasovagal Syncope (-2)  Cardiac Syncope (+2)  (Neither (0))  ___________________________________  Score -3 to 0: Very low risk (serious adverse events <=1.9% in 30 days)  Score 1 to 3: Medium risk (serious adverse events >=3% in 30 days)  Score 4 to 5: High risk  Score >=6: Very high risk      Procedures:  Procedures        PPE: I followed hospital protocols for proper PPE based on patient  presentation including use of N95 mask for suspected infectious respiratory conditions.  Proper hand hygiene was performed both before and after the patient encounter.          Diagnosis  Final diagnoses:   Syncope and collapse   Right lower quadrant abdominal pain   Abnormal pelvic ultrasound       Note Disclaimer: At [facility], we believe that sharing information builds trust and better relationships. You are receiving this note because you recently visited [facility]. It is possible you will see health information before a provider has talked with you about it. This kind of information can be easy to misunderstand. To help you fully understand what it means for your health, we urge you to discuss this note with your provider.       Gloria Cabrera MD  05/04/24 0456

## 2024-05-02 NOTE — DISCHARGE INSTRUCTIONS
You have been given emergency department evaluation.  This evaluation is intended to rule out life-threatening conditions.  Is not a complete evaluation.  You could require further testing as determined by your primary care physician or any referred specialist.  Please follow-up with all doctors that you are referred to.  Please be sure to take your prescribed medications and follow any specific instructions in the discharge instructions.  Please follow-up with your primary care physician within 48 hours.  Please have your primary care provider recheck your blood pressure.  Rest.  Drink plenty of fluids, stay hydrated.  Please follow-up with cardiology for further evaluation and management of syncopal episodes.  Please follow-up with your OB/GYN for abnormal finding on your pelvic ultrasound which includes follicles on your ovaries.  Please return to the emergency department if you experience chest pain, shortness of breath, abdominal pain, fever greater than 102, intractable vomiting.  Please return to the emergency department if your symptoms continue or worsen, or if you begin to experience any other concerning symptom.

## 2025-02-28 ENCOUNTER — APPOINTMENT (OUTPATIENT)
Dept: CT IMAGING | Facility: HOSPITAL | Age: 40
End: 2025-02-28

## 2025-02-28 ENCOUNTER — HOSPITAL ENCOUNTER (EMERGENCY)
Facility: HOSPITAL | Age: 40
Discharge: HOME | End: 2025-03-01
Attending: EMERGENCY MEDICINE

## 2025-02-28 DIAGNOSIS — N83.209 HEMORRHAGIC OVARIAN CYST: Primary | ICD-10-CM

## 2025-02-28 DIAGNOSIS — R91.1 PULMONARY NODULE, RIGHT: ICD-10-CM

## 2025-02-28 DIAGNOSIS — N39.0 UTI (URINARY TRACT INFECTION), UNCOMPLICATED: ICD-10-CM

## 2025-02-28 LAB
ALBUMIN SERPL-MCNC: 4.3 G/DL (ref 3.5–5.2)
ALBUMIN/GLOB SERPL: 1.8 G/DL
ALP SERPL-CCNC: 74 U/L (ref 39–117)
ALT SERPL W P-5'-P-CCNC: 29 U/L (ref 1–33)
ANION GAP SERPL CALCULATED.3IONS-SCNC: 8.8 MMOL/L (ref 5–15)
AST SERPL-CCNC: 27 U/L (ref 1–32)
BASOPHILS # BLD AUTO: 0.02 10*3/MM3 (ref 0–0.2)
BASOPHILS NFR BLD AUTO: 0.3 % (ref 0–1.5)
BILIRUB SERPL-MCNC: 0.2 MG/DL (ref 0–1.2)
BILIRUB UR QL STRIP: NEGATIVE
BUN SERPL-MCNC: 15 MG/DL (ref 6–20)
BUN/CREAT SERPL: 14.7 (ref 7–25)
CALCIUM SPEC-SCNC: 8.8 MG/DL (ref 8.6–10.5)
CHLORIDE SERPL-SCNC: 103 MMOL/L (ref 98–107)
CLARITY UR: CLEAR
CO2 SERPL-SCNC: 26.2 MMOL/L (ref 22–29)
COLOR UR: YELLOW
CREAT SERPL-MCNC: 1.02 MG/DL (ref 0.57–1)
DEPRECATED RDW RBC AUTO: 43.6 FL (ref 37–54)
EGFRCR SERPLBLD CKD-EPI 2021: 71.9 ML/MIN/1.73
EOSINOPHIL # BLD AUTO: 0.22 10*3/MM3 (ref 0–0.4)
EOSINOPHIL NFR BLD AUTO: 3.3 % (ref 0.3–6.2)
ERYTHROCYTE [DISTWIDTH] IN BLOOD BY AUTOMATED COUNT: 14.1 % (ref 12.3–15.4)
GLOBULIN UR ELPH-MCNC: 2.4 GM/DL
GLUCOSE SERPL-MCNC: 104 MG/DL (ref 65–99)
GLUCOSE UR STRIP-MCNC: NEGATIVE MG/DL
HCG SERPL QL: NEGATIVE
HCT VFR BLD AUTO: 37.8 % (ref 34–46.6)
HGB BLD-MCNC: 12.2 G/DL (ref 12–15.9)
HGB UR QL STRIP.AUTO: NEGATIVE
HOLD SPECIMEN: NORMAL
HOLD SPECIMEN: NORMAL
IMM GRANULOCYTES # BLD AUTO: 0.01 10*3/MM3 (ref 0–0.05)
IMM GRANULOCYTES NFR BLD AUTO: 0.1 % (ref 0–0.5)
KETONES UR QL STRIP: NEGATIVE
LEUKOCYTE ESTERASE UR QL STRIP.AUTO: ABNORMAL
LIPASE SERPL-CCNC: 34 U/L (ref 13–60)
LYMPHOCYTES # BLD AUTO: 2.46 10*3/MM3 (ref 0.7–3.1)
LYMPHOCYTES NFR BLD AUTO: 36.6 % (ref 19.6–45.3)
MCH RBC QN AUTO: 27.1 PG (ref 26.6–33)
MCHC RBC AUTO-ENTMCNC: 32.3 G/DL (ref 31.5–35.7)
MCV RBC AUTO: 83.8 FL (ref 79–97)
MONOCYTES # BLD AUTO: 0.78 10*3/MM3 (ref 0.1–0.9)
MONOCYTES NFR BLD AUTO: 11.6 % (ref 5–12)
NEUTROPHILS NFR BLD AUTO: 3.24 10*3/MM3 (ref 1.7–7)
NEUTROPHILS NFR BLD AUTO: 48.1 % (ref 42.7–76)
NITRITE UR QL STRIP: NEGATIVE
PH UR STRIP.AUTO: 6 [PH] (ref 5–8)
PLATELET # BLD AUTO: 219 10*3/MM3 (ref 140–450)
PMV BLD AUTO: 8.9 FL (ref 6–12)
POTASSIUM SERPL-SCNC: 4 MMOL/L (ref 3.5–5.2)
PROT SERPL-MCNC: 6.7 G/DL (ref 6–8.5)
PROT UR QL STRIP: NEGATIVE
RBC # BLD AUTO: 4.51 10*6/MM3 (ref 3.77–5.28)
SODIUM SERPL-SCNC: 138 MMOL/L (ref 136–145)
SP GR UR STRIP: 1.02 (ref 1–1.03)
TROPONIN T SERPL HS-MCNC: <6 NG/L
UROBILINOGEN UR QL STRIP: ABNORMAL
WBC NRBC COR # BLD AUTO: 6.73 10*3/MM3 (ref 3.4–10.8)
WHOLE BLOOD HOLD COAG: NORMAL
WHOLE BLOOD HOLD SPECIMEN: NORMAL

## 2025-02-28 PROCEDURE — 85025 COMPLETE CBC W/AUTO DIFF WBC: CPT | Performed by: EMERGENCY MEDICINE

## 2025-02-28 PROCEDURE — 87086 URINE CULTURE/COLONY COUNT: CPT | Performed by: EMERGENCY MEDICINE

## 2025-02-28 PROCEDURE — 93010 ELECTROCARDIOGRAM REPORT: CPT | Performed by: INTERNAL MEDICINE

## 2025-02-28 PROCEDURE — 25810000003 SODIUM CHLORIDE 0.9 % SOLUTION: Performed by: EMERGENCY MEDICINE

## 2025-02-28 PROCEDURE — 93005 ELECTROCARDIOGRAM TRACING: CPT | Performed by: EMERGENCY MEDICINE

## 2025-02-28 PROCEDURE — 36415 COLL VENOUS BLD VENIPUNCTURE: CPT

## 2025-02-28 PROCEDURE — 25510000001 IOPAMIDOL PER 1 ML: Performed by: EMERGENCY MEDICINE

## 2025-02-28 PROCEDURE — 99284 EMERGENCY DEPT VISIT MOD MDM: CPT | Performed by: EMERGENCY MEDICINE

## 2025-02-28 PROCEDURE — 96360 HYDRATION IV INFUSION INIT: CPT

## 2025-02-28 PROCEDURE — 83690 ASSAY OF LIPASE: CPT | Performed by: EMERGENCY MEDICINE

## 2025-02-28 PROCEDURE — 84484 ASSAY OF TROPONIN QUANT: CPT | Performed by: EMERGENCY MEDICINE

## 2025-02-28 PROCEDURE — 81001 URINALYSIS AUTO W/SCOPE: CPT | Performed by: EMERGENCY MEDICINE

## 2025-02-28 PROCEDURE — 99285 EMERGENCY DEPT VISIT HI MDM: CPT

## 2025-02-28 PROCEDURE — 74177 CT ABD & PELVIS W/CONTRAST: CPT

## 2025-02-28 PROCEDURE — 80053 COMPREHEN METABOLIC PANEL: CPT | Performed by: EMERGENCY MEDICINE

## 2025-02-28 PROCEDURE — 84703 CHORIONIC GONADOTROPIN ASSAY: CPT | Performed by: EMERGENCY MEDICINE

## 2025-02-28 RX ORDER — SODIUM CHLORIDE 0.9 % (FLUSH) 0.9 %
10 SYRINGE (ML) INJECTION AS NEEDED
Status: DISCONTINUED | OUTPATIENT
Start: 2025-02-28 | End: 2025-03-01 | Stop reason: HOSPADM

## 2025-02-28 RX ORDER — IOPAMIDOL 755 MG/ML
100 INJECTION, SOLUTION INTRAVASCULAR
Status: COMPLETED | OUTPATIENT
Start: 2025-02-28 | End: 2025-02-28

## 2025-02-28 RX ADMIN — IOPAMIDOL 85 ML: 755 INJECTION, SOLUTION INTRAVENOUS at 23:39

## 2025-02-28 RX ADMIN — SODIUM CHLORIDE 1000 ML: 9 INJECTION, SOLUTION INTRAVENOUS at 23:44

## 2025-03-01 VITALS
WEIGHT: 170 LBS | HEART RATE: 69 BPM | DIASTOLIC BLOOD PRESSURE: 88 MMHG | BODY MASS INDEX: 23.8 KG/M2 | RESPIRATION RATE: 18 BRPM | HEIGHT: 71 IN | OXYGEN SATURATION: 99 % | SYSTOLIC BLOOD PRESSURE: 134 MMHG | TEMPERATURE: 98.8 F

## 2025-03-01 LAB
BACTERIA UR QL AUTO: ABNORMAL /HPF
GEN 5 1HR TROPONIN T REFLEX: <6 NG/L
HOLD SPECIMEN: NORMAL
HYALINE CASTS UR QL AUTO: ABNORMAL /LPF
QT INTERVAL: 419 MS
QTC INTERVAL: 457 MS
RBC # UR STRIP: ABNORMAL /HPF
REF LAB TEST METHOD: ABNORMAL
SQUAMOUS #/AREA URNS HPF: ABNORMAL /HPF
TROPONIN T NUMERIC DELTA: NORMAL
WBC # UR STRIP: ABNORMAL /HPF

## 2025-03-01 RX ORDER — CEFDINIR 300 MG/1
300 CAPSULE ORAL 2 TIMES DAILY
Qty: 14 CAPSULE | Refills: 0 | Status: SHIPPED | OUTPATIENT
Start: 2025-03-01 | End: 2025-03-08

## 2025-03-01 NOTE — ED NOTES
"Pt to Room 05 with complaint of intermittent abdominal pain that started around 5 days ago.  Pt denies trauma to the area.  States that the pain sometimes goes into her right groin, upper right side, mid-abdominal discomfort that she compares to \"labor pains.\"  Pt states that her \"heart hurt this week too, like it was sore.  I wasn't sure if it was gas or not.\"  Pt has hx of PTSD/anxiety.  Pt denies fever, chills, n/v/d, blurred vision, loss in control of bowel/bladder, numbness and/or tingling of the extremities.  Pt is alert and oriented X4, PERRLA, respirations are even and unlabored, chest rise and fall is equal in expansion.  Pt does not appear to be in distress at this time.   States she still does have her gallbladder and appendix.  Denies urinary symptoms.   "

## 2025-03-01 NOTE — [facility] PROVIDER NOTE
"Subjective   History of Present Illness  38yo female presents ED c/o 5d hx intermittent right sided abdominal pain characterized as \"sharp/crampy\" similar to labor pains/neg exac or relieve factors/neg associated symptoms.  Pt does endorse previous chest discomfort.  Denies fever/chills/vomiting/diarrhea/dysuria/hematuria/vaginal bleeding/vaginal discharge/melena/hematochoezia.    History provided by:  Patient  Abdominal Pain  Pain location:  RLQ  Associated symptoms: chest pain    Associated symptoms: no diarrhea, no nausea and no vomiting        Review of Systems   Constitutional: Negative.    HENT: Negative.     Eyes: Negative.    Respiratory: Negative.     Cardiovascular:  Positive for chest pain.   Gastrointestinal:  Positive for abdominal pain. Negative for blood in stool, diarrhea, nausea and vomiting.   Genitourinary: Negative.    All other systems reviewed and are negative.      Past Medical History:   Diagnosis Date    Drug therapy     Rheumatoid arteritis        Allergies   Allergen Reactions    Fexofenadine Hives and Rash       History reviewed. No pertinent surgical history.    Family History   Problem Relation Age of Onset    Breast cancer Mother 41               Social History     Socioeconomic History    Marital status:    Tobacco Use    Smoking status: Never     Passive exposure: Never    Smokeless tobacco: Never   Vaping Use    Vaping status: Never Used   Substance and Sexual Activity    Alcohol use: Yes    Drug use: Never    Sexual activity: Defer           Objective   Physical Exam  Vitals and nursing note reviewed.   Constitutional:       Appearance: Normal appearance. She is not toxic-appearing or diaphoretic.   HENT:      Head: Normocephalic and atraumatic.      Right Ear: External ear normal.      Left Ear: External ear normal.      Nose: Nose normal.      Mouth/Throat:      Mouth: Mucous membranes are moist.      Pharynx: Oropharynx is clear.   Eyes:      Pupils: Pupils are " ANTICOAGULATION MANAGEMENT     Ronna Coleman 26 year old female is on warfarin with therapeutic INR result. (Goal INR 2.0-3.0)    Recent labs: (last 7 days)     07/25/24  1355   INR 2.53*       ASSESSMENT     Warfarin Lab Questionnaire    Warfarin Doses Last 7 Days      7/25/2024    11:57 AM   Dose in Tablet or Mg   TAB or MG? tablet (tab)     Pt Rptd Dose SUNDAY MONDAY TUESDAY WED THURS FRIDAY SATURDAY 7/25/2024  11:57 AM 1.5 1.5 1.5 1.5 1.5 1.5 2         7/25/2024   Warfarin Lab Questionnaire   Missed doses within past 14 days? No   Changes in diet or alcohol within past 14 days? No   Medication changes since last result? No   Injuries or illness since last result? No   New shortness of breath, severe headaches or sudden changes in vision since last result? No   Abnormal bleeding since last result? No   Upcoming surgery, procedure? No        Previous result: Subtherapeutic  Additional findings: It looks like she continued to take previous dosing of 10 mg on Saturdays and 7.5 mg all other days according to the survey. Since the INR is in range, will have pt continue current dosing.       PLAN       Dosing Instructions:  Take 10 mg on Saturdays and 7.5 mg all other days  with next INR in 4-5 weeks       Summary  As of 7/26/2024      Full warfarin instructions:  10 mg every Sat; 7.5 mg all other days   Next INR check:  8/29/2024               Detailed voice message left for Ronna with dosing instructions and follow up date.     Contact [phone] to schedule and with any changes, questions or concerns.     Education provided: Please call back if any changes to your diet, medications or how you've been taking warfarin  Contact [phone] with any changes, questions or concerns.     Plan made per [facility] anticoagulation protocol    Linda Velez, RN  Anticoagulation Clinic  7/26/2024    _______________________________________________________________________     Anticoagulation Episode Summary       Current INR  equal, round, and reactive to light.   Cardiovascular:      Rate and Rhythm: Normal rate and regular rhythm.      Pulses: Normal pulses.      Heart sounds: Normal heart sounds. No murmur heard.     No friction rub. No gallop.   Pulmonary:      Effort: Pulmonary effort is normal.      Breath sounds: Normal breath sounds. No wheezing, rhonchi or rales.   Abdominal:      General: Abdomen is flat. Bowel sounds are normal.      Palpations: Abdomen is soft.      Tenderness:  in the right lower quadrant, suprapubic area and left lower quadrant There is guarding. There is no rebound. Positive signs include McBurney's sign. Negative signs include Robison's sign and Rovsing's sign.      Hernia: There is no hernia in the umbilical area or ventral area.       Musculoskeletal:         General: No swelling or deformity.      Cervical back: Normal range of motion and neck supple. No rigidity.      Right lower leg: No edema.      Left lower leg: No edema.   Lymphadenopathy:      Cervical: No cervical adenopathy.   Skin:     General: Skin is warm and dry.   Neurological:      General: No focal deficit present.      Mental Status: She is alert and oriented to person, place, and time.      GCS: GCS eye subscore is 4. GCS verbal subscore is 5. GCS motor subscore is 6.         ECG 12 Lead      Date/Time: 3/1/2025 12:41 AM    Performed by: Dallas Alejandro MD  Authorized by: Dallas Alejandro MD  Interpreted by ED physician  Rhythm: sinus rhythm  Rate: normal  BPM: 72  QRS axis: normal  Conduction: conduction normal  ST Segments: ST segments normal  T Waves: T waves normal  Other: no other findings  Clinical impression: normal ECG               ED Course      Labs Reviewed   URINALYSIS W/ CULTURE IF INDICATED - Abnormal; Notable for the following components:       Result Value    Leuk Esterase, UA Moderate (2+) (*)     All other components within normal limits    Narrative:     In absence of clinical symptoms, the presence of pyuria,  goal:  2.0-3.0   TTR:  44.8% (8.6 mo)   Target end date:  Indefinite   Send INR reminders to:  [facility]    Indications    Antiphospholipid antibody positive [R76.0]  Long term current use of anticoagulants with INR goal of 2.0-3.0 [Z79.01]  Acute deep vein thrombosis (DVT) of proximal end of both lower extremities (H) [I82.4Y3]  Acute deep vein thrombosis (DVT) of other specified vein of right lower extremity (H) [I82.491]             Comments:               Anticoagulation Care Providers       Provider Role Specialty Phone number    Jose Tierney MD Referring Family Medicine [phone]    Wendy Jacob APRN CNP Referring Family Medicine [phone]              bacteria, and/or nitrites on the urinalysis result does not correlate with infection.   COMPREHENSIVE METABOLIC PANEL - Abnormal; Notable for the following components:    Glucose 104 (*)     Creatinine 1.02 (*)     All other components within normal limits    Narrative:     GFR Categories in Chronic Kidney Disease (CKD)      GFR Category          GFR (mL/min/1.73)    Interpretation  G1                     90 or greater         Normal or high (1)  G2                      60-89                Mild decrease (1)  G3a                   45-59                Mild to moderate decrease  G3b                   30-44                Moderate to severe decrease  G4                    15-29                Severe decrease  G5                    14 or less           Kidney failure          (1)In the absence of evidence of kidney disease, neither GFR category G1 or G2 fulfill the criteria for CKD.    eGFR calculation 2021 CKD-EPI creatinine equation, which does not include race as a factor   LIPASE - Normal   TROPONIN - Normal    Narrative:     High Sensitive Troponin T Reference Range:  <14.0 ng/L- Negative Female for AMI  <22.0 ng/L- Negative Male for AMI  >=14 - Abnormal Female indicating possible myocardial injury.  >=22 - Abnormal Male indicating possible myocardial injury.   Clinicians would have to utilize clinical acumen, EKG, Troponin, and serial changes to determine if it is an Acute Myocardial Infarction or myocardial injury due to an underlying chronic condition.        CBC WITH AUTO DIFFERENTIAL - Normal   HCG, SERUM, QUALITATIVE - Normal   RAINBOW DRAW    Narrative:     The following orders were created for panel order [place] Draw.  Procedure                               Abnormality         Status                     ---------                               -----------         ------                     Green Top (Gel)[[id number]]                                  Final result               Lavender Top[[id number]]                                      Final result               Gold Top - SST[[id number]]                                   Final result               Light Blue Top[[id number]]                                   Final result               Green Top (Gel)[[id number]]                                                               Please view results for these tests on the individual orders.   HIGH SENSITIVITIY TROPONIN T 1HR    Narrative:     High Sensitive Troponin T Reference Range:  <14.0 ng/L- Negative Female for AMI  <22.0 ng/L- Negative Male for AMI  >=14 - Abnormal Female indicating possible myocardial injury.  >=22 - Abnormal Male indicating possible myocardial injury.   Clinicians would have to utilize clinical acumen, EKG, Troponin, and serial changes to determine if it is an Acute Myocardial Infarction or myocardial injury due to an underlying chronic condition.        URINALYSIS, MICROSCOPIC ONLY   RAINBOW URINE CULTURE TUBE   CBC AND DIFFERENTIAL    Narrative:     The following orders were created for panel order CBC & Differential.  Procedure                               Abnormality         Status                     ---------                               -----------         ------                     CBC Auto Differential[[id number]]        Normal              Final result                 Please view results for these tests on the individual orders.   GREEN TOP   LAVENDER TOP   GOLD TOP - SST   LIGHT BLUE TOP     CT Abdomen Pelvis With Contrast    Result Date: 2/28/2025  Narrative: CT OF THE ABDOMEN AND PELVIS WITH CONTRAST  HISTORY: Bilateral lower abdominal pain  COMPARISON: May 2, 2024  TECHNIQUE: Axial CT imaging was obtained through the abdomen and pelvis. IV contrast was administered.  FINDINGS: 5 mm nodule is noted within the right lower lobe anteriorly. An additional 3 mm nodule at the right lung base was present in May 2024, and is unchanged. No suspicious hepatic lesions are seen. The stomach, duodenum,  adrenal glands, spleen, pancreas, and gallbladder all appear normal. The kidneys enhance symmetrically. No hydronephrosis is seen. The uterus is unremarkable. Tiny hemorrhagic cyst is noted on the right ovary, measuring 1.0 cm. No acute osseous abnormalities are seen.. There is no bowel obstruction. There is increased stool burden within the colon, particularly within the ascending colon. Appearance may reflect constipation. I don't see any evidence of appendicitis.      Impression:  1. Tiny hemorrhagic cyst suspected on the right ovary, measuring 1 cm. 2. Increased stool burden, particularly within the ascending colon may reflect constipation.  3. Indeterminate solid pulmonary nodule measuring 5 mm. In a low-risk patient with a solid nodule <6 mm, recommend no follow-up. In a high-risk patient, CT at 12 months is optional with stronger consideration if there is suspicious nodule morphology and/or upper lobe location.  Radiation dose reduction techniques were utilized, including automated exposure control and exposure modulation based on body size.   This report was finalized on 2/28/2025 11:50 PM by Dr. Iraida Toure M.D on Workstation: [employer]                                          Medical Decision Making  Labs/radiographic findings reviewed.  EKG: NWSR/rate 72/normal axis/no acute STTW changes.  CBC/CMP/lipase: unremarkable.  Hcg: negative.  UA: LE 2+.  hsTnT: neg x2.  CT abd/pelvis: significant 1cm hemorrhagic ovarian cyst; incidental RLL pulmonary nodule 5mm.  Pt reports complete resolution of pain during CT examination.  Denies abdominal pain at time of discharge.  Stable dc with pmd/gyn followup.  Plan omnicef 300mg bid x7d.  Strict return precautions.    Problems Addressed:  Hemorrhagic ovarian cyst: complicated acute illness or injury  Pulmonary nodule, right: complicated acute illness or injury  UTI (urinary tract infection), uncomplicated: complicated acute illness or injury    Amount and/or  Complexity of Data Reviewed  Labs: ordered.  Radiology: ordered.  ECG/medicine tests: ordered and independent interpretation performed.    Risk  Prescription drug management.        Final diagnoses:   Hemorrhagic ovarian cyst   Pulmonary nodule, right   UTI (urinary tract infection), uncomplicated       ED Disposition  ED Disposition       ED Disposition   Discharge    Condition   Good    Comment   --               Primary Care Provider    Schedule an appointment as soon as possible for a visit       Primary OB/GYN    In 2 days           Medication List        New Prescriptions      cefdinir 300 MG capsule  Commonly known as: OMNICEF  Take 1 capsule by mouth 2 (Two) Times a Day for 7 days.               Where to Get Your Medications        These medications were sent to [id number] - [place], [place] - [address]. - [phone]  - [phone] [address], [facility] [address]      Phone: [phone]   cefdinir 300 MG capsule

## 2025-03-02 LAB — BACTERIA SPEC AEROBE CULT: NO GROWTH
